# Patient Record
Sex: FEMALE | Race: BLACK OR AFRICAN AMERICAN | Employment: FULL TIME | URBAN - METROPOLITAN AREA
[De-identification: names, ages, dates, MRNs, and addresses within clinical notes are randomized per-mention and may not be internally consistent; named-entity substitution may affect disease eponyms.]

---

## 2022-09-08 NOTE — PROGRESS NOTES
Problem Visit-Complete  (New patient)    Chief Complaint   Pelvic Pain      HPI  Lacretia Chon is a 25 y.o. female who presents for the evaluation of abdominal pain and \"swollen cervix. \"    Patient's last menstrual period was 08/20/2022. C/o abdominal pain for several weeks. Just right of umbilicus. Constant. Occasional sharp pain \"in my cervix\". Has had some diarrhea for the last 3 days, but otherwise denies bowel changes. No urinary sx. Not having discharge, vaginal irritation, itching. Was seen at [facility].  Reports GC/CT neg, nl pap. Was told \"swollen cervix\"  Has been reading online, wondering about PID, cervicitis. Ultrasound today showed:  TRANSVAGINAL ULTRASOUND PERFORMED  UTERUS IS ANTEVERTED, NORMAL IN SIZE AND ECHOGENICITY. ENDOMETRIUM MEASURES 10-11MM IN THICKNESS. NO EVIDENCE OF MASSES OR ABNORMALITIES ARE SEEN. RIGHT OVARY APPEARS TO HAVE A COMPLEX CYST WITH BLOODFLOW IN THE PERIPHERY THAT MEASURES 24  X 22 X 18MM. THIS MAY REPRESENT A HEMORRHAGIC CYST. LEFT OVARY APPEARS WITHIN NORMAL LIMITS. FREE FLUID SEEN IN THE CDS. No past medical history on file. No past surgical history on file. Social History     Occupational History    Not on file   Tobacco Use    Smoking status: Never    Smokeless tobacco: Never   Vaping Use    Vaping Use: Never used   Substance and Sexual Activity    Alcohol use: Never    Drug use: Never    Sexual activity: Yes     Partners: Female, Male     Birth control/protection: None     No family history on file.     Not on File  Prior to Admission medications    Not on File        Review of Systems: History obtained from the patient  Constitutional: negative for weight loss, fever, night sweats  HEENT: negative for hearing loss, earache, congestion, snoring, sorethroat  CV: negative for chest pain, palpitations, edema  Resp: negative for cough, shortness of breath, wheezing  Breast: negative for breast lumps, nipple discharge, galactorrhea  GI: see HPI  : negative for frequency, dysuria, hematuria, vaginal discharge  MSK: negative for back pain, joint pain, muscle pain  Skin: negative for itching, rash, hives  Neuro: negative for dizziness, headache, confusion, weakness  Psych: negative for anxiety, depression, change in mood  Heme/lymph: negative for bleeding, bruising, pallor    Objective:  Visit Vitals  /86   Pulse 92   Ht 5' 4\" (1.626 m)   Wt 105 lb (47.6 kg)   LMP 08/20/2022   BMI 18.02 kg/m²       Physical Exam:     Constitutional  Appearance: well-nourished, well developed, alert, in no acute distress    HENT  Head and Face: appears normal    Neck  Inspection/Palpation: normal appearance, no masses or tenderness  Lymph Nodes: no lymphadenopathy present  Thyroid: gland size normal, nontender, no nodules or masses present on palpation      Gastrointestinal  Abdominal Examination: abdomen soft, no guarding or rebound, non-tender to palpation, normal bowel sounds, no masses present  Liver and spleen: no hepatomegaly present, spleen not palpable  Hernias: no hernias identified            Genitourinary  External Genitalia: normal appearance for age, no discharge present, no tenderness present, no inflammatory lesions present, no masses present, no atrophy present  Vagina: normal vaginal vault without central or paravaginal defects, no discharge present, no inflammatory lesions present, no masses present  Bladder: non-tender to palpation  Urethra: appears normal  Cervix: normal, no discharge, no CMT  Uterus: normal size, shape and consistency; AV  Adnexa: no adnexal tenderness present, no adnexal masses present  Perineum: perineum within normal limits, no evidence of trauma, no rashes or skin lesions present  Anus: anus within normal limits, no hemorrhoids present  Inguinal Lymph Nodes: no lymphadenopathy present    Skin  General Inspection: no rash, no lesions identified    Neurologic/Psychiatric  Mental Status:  Orientation: grossly oriented to person, place and time  Mood and Affect: mood normal, affect appropriate    Assessment:  Abdominal pain  US today wnl with incidental finding of ROV physiolgic cyst (CL). NT on exam.  Reports GC/CT neg at recent [facility] visit    Plan:   Advised to f/up with PCP  Offered to rpt US to reassess for resolution of ROV cyst.  Declines. Will schedule if she continues to have sx. Reassured no evidence of PID or cervicitis.

## 2022-09-09 ENCOUNTER — OFFICE VISIT (OUTPATIENT)
Dept: OBGYN CLINIC | Age: 22
End: 2022-09-09

## 2022-09-09 VITALS
WEIGHT: 105 LBS | HEART RATE: 92 BPM | HEIGHT: 64 IN | DIASTOLIC BLOOD PRESSURE: 86 MMHG | BODY MASS INDEX: 17.93 KG/M2 | SYSTOLIC BLOOD PRESSURE: 125 MMHG

## 2022-09-09 DIAGNOSIS — R10.33 PERIUMBILICAL ABDOMINAL PAIN: Primary | ICD-10-CM

## 2022-09-09 PROCEDURE — 99202 OFFICE O/P NEW SF 15 MIN: CPT | Performed by: OBSTETRICS & GYNECOLOGY
